# Patient Record
Sex: FEMALE | Race: WHITE | NOT HISPANIC OR LATINO | Employment: STUDENT | URBAN - METROPOLITAN AREA
[De-identification: names, ages, dates, MRNs, and addresses within clinical notes are randomized per-mention and may not be internally consistent; named-entity substitution may affect disease eponyms.]

---
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
## 2022-01-19 ENCOUNTER — PATIENT MESSAGE (OUTPATIENT)
Dept: ALLERGY | Facility: CLINIC | Age: 22
End: 2022-01-19
Payer: COMMERCIAL

## 2022-04-07 ENCOUNTER — LAB VISIT (OUTPATIENT)
Dept: LAB | Facility: HOSPITAL | Age: 22
End: 2022-04-07
Attending: FAMILY MEDICINE
Payer: COMMERCIAL

## 2022-04-07 ENCOUNTER — OFFICE VISIT (OUTPATIENT)
Dept: ALLERGY | Facility: CLINIC | Age: 22
End: 2022-04-07
Payer: COMMERCIAL

## 2022-04-07 VITALS
HEIGHT: 60 IN | TEMPERATURE: 98 F | DIASTOLIC BLOOD PRESSURE: 81 MMHG | SYSTOLIC BLOOD PRESSURE: 129 MMHG | RESPIRATION RATE: 20 BRPM | HEART RATE: 73 BPM | WEIGHT: 121.5 LBS | BODY MASS INDEX: 23.85 KG/M2

## 2022-04-07 DIAGNOSIS — G93.49 HASHIMOTO ENCEPHALOPATHY: ICD-10-CM

## 2022-04-07 DIAGNOSIS — Z79.60 LONG-TERM USE OF IMMUNOSUPPRESSANT MEDICATION: ICD-10-CM

## 2022-04-07 DIAGNOSIS — E55.9 VITAMIN D INSUFFICIENCY: ICD-10-CM

## 2022-04-07 DIAGNOSIS — E06.3 HASHIMOTO ENCEPHALOPATHY: Primary | ICD-10-CM

## 2022-04-07 DIAGNOSIS — E06.3 HASHIMOTO ENCEPHALOPATHY: ICD-10-CM

## 2022-04-07 DIAGNOSIS — G93.49 HASHIMOTO ENCEPHALOPATHY: Primary | ICD-10-CM

## 2022-04-07 LAB
25(OH)D3+25(OH)D2 SERPL-MCNC: 50 NG/ML (ref 30–96)
ALBUMIN SERPL BCP-MCNC: 4.3 G/DL (ref 3.5–5.2)
ALP SERPL-CCNC: 62 U/L (ref 55–135)
ALT SERPL W/O P-5'-P-CCNC: 12 U/L (ref 10–44)
ANION GAP SERPL CALC-SCNC: 10 MMOL/L (ref 8–16)
AST SERPL-CCNC: 17 U/L (ref 10–40)
BASOPHILS # BLD AUTO: 0.04 K/UL (ref 0–0.2)
BASOPHILS NFR BLD: 0.5 % (ref 0–1.9)
BILIRUB SERPL-MCNC: 0.3 MG/DL (ref 0.1–1)
BUN SERPL-MCNC: 13 MG/DL (ref 6–20)
CALCIUM SERPL-MCNC: 10.3 MG/DL (ref 8.7–10.5)
CHLORIDE SERPL-SCNC: 99 MMOL/L (ref 95–110)
CO2 SERPL-SCNC: 28 MMOL/L (ref 23–29)
CREAT SERPL-MCNC: 0.6 MG/DL (ref 0.5–1.4)
CRP SERPL-MCNC: 1 MG/L (ref 0–8.2)
DIFFERENTIAL METHOD: ABNORMAL
EOSINOPHIL # BLD AUTO: 0.2 K/UL (ref 0–0.5)
EOSINOPHIL NFR BLD: 2.9 % (ref 0–8)
ERYTHROCYTE [DISTWIDTH] IN BLOOD BY AUTOMATED COUNT: 12.5 % (ref 11.5–14.5)
ERYTHROCYTE [SEDIMENTATION RATE] IN BLOOD BY WESTERGREN METHOD: 20 MM/HR (ref 0–36)
EST. GFR  (AFRICAN AMERICAN): >60 ML/MIN/1.73 M^2
EST. GFR  (NON AFRICAN AMERICAN): >60 ML/MIN/1.73 M^2
GLUCOSE SERPL-MCNC: 80 MG/DL (ref 70–110)
HCT VFR BLD AUTO: 40.5 % (ref 37–48.5)
HGB BLD-MCNC: 13 G/DL (ref 12–16)
IMM GRANULOCYTES # BLD AUTO: 0.02 K/UL (ref 0–0.04)
IMM GRANULOCYTES NFR BLD AUTO: 0.3 % (ref 0–0.5)
LYMPHOCYTES # BLD AUTO: 2.2 K/UL (ref 1–4.8)
LYMPHOCYTES NFR BLD: 30.4 % (ref 18–48)
MCH RBC QN AUTO: 29.4 PG (ref 27–31)
MCHC RBC AUTO-ENTMCNC: 32.1 G/DL (ref 32–36)
MCV RBC AUTO: 92 FL (ref 82–98)
MONOCYTES # BLD AUTO: 0.7 K/UL (ref 0.3–1)
MONOCYTES NFR BLD: 9.3 % (ref 4–15)
NEUTROPHILS # BLD AUTO: 4.1 K/UL (ref 1.8–7.7)
NEUTROPHILS NFR BLD: 56.6 % (ref 38–73)
NRBC BLD-RTO: 0 /100 WBC
PLATELET # BLD AUTO: 544 K/UL (ref 150–450)
PMV BLD AUTO: 9.4 FL (ref 9.2–12.9)
POTASSIUM SERPL-SCNC: 4 MMOL/L (ref 3.5–5.1)
PROT SERPL-MCNC: 7.7 G/DL (ref 6–8.4)
RBC # BLD AUTO: 4.42 M/UL (ref 4–5.4)
SODIUM SERPL-SCNC: 137 MMOL/L (ref 136–145)
T4 FREE SERPL-MCNC: 0.86 NG/DL (ref 0.71–1.51)
THYROGLOB AB SERPL IA-ACNC: <4 IU/ML (ref 0–3.9)
THYROPEROXIDASE IGG SERPL-ACNC: 160.8 IU/ML
TSH SERPL DL<=0.005 MIU/L-ACNC: 1.03 UIU/ML (ref 0.4–4)
WBC # BLD AUTO: 7.3 K/UL (ref 3.9–12.7)

## 2022-04-07 PROCEDURE — 1160F PR REVIEW ALL MEDS BY PRESCRIBER/CLIN PHARMACIST DOCUMENTED: ICD-10-PCS | Mod: CPTII,S$GLB,, | Performed by: PEDIATRICS

## 2022-04-07 PROCEDURE — 1160F RVW MEDS BY RX/DR IN RCRD: CPT | Mod: CPTII,S$GLB,, | Performed by: PEDIATRICS

## 2022-04-07 PROCEDURE — 86376 MICROSOMAL ANTIBODY EACH: CPT | Performed by: PEDIATRICS

## 2022-04-07 PROCEDURE — 3074F PR MOST RECENT SYSTOLIC BLOOD PRESSURE < 130 MM HG: ICD-10-PCS | Mod: CPTII,S$GLB,, | Performed by: PEDIATRICS

## 2022-04-07 PROCEDURE — 1159F MED LIST DOCD IN RCRD: CPT | Mod: CPTII,S$GLB,, | Performed by: PEDIATRICS

## 2022-04-07 PROCEDURE — 85652 RBC SED RATE AUTOMATED: CPT | Performed by: PEDIATRICS

## 2022-04-07 PROCEDURE — 3008F PR BODY MASS INDEX (BMI) DOCUMENTED: ICD-10-PCS | Mod: CPTII,S$GLB,, | Performed by: PEDIATRICS

## 2022-04-07 PROCEDURE — 3074F SYST BP LT 130 MM HG: CPT | Mod: CPTII,S$GLB,, | Performed by: PEDIATRICS

## 2022-04-07 PROCEDURE — 84439 ASSAY OF FREE THYROXINE: CPT | Performed by: PEDIATRICS

## 2022-04-07 PROCEDURE — 36415 COLL VENOUS BLD VENIPUNCTURE: CPT | Performed by: PEDIATRICS

## 2022-04-07 PROCEDURE — 3079F DIAST BP 80-89 MM HG: CPT | Mod: CPTII,S$GLB,, | Performed by: PEDIATRICS

## 2022-04-07 PROCEDURE — 86140 C-REACTIVE PROTEIN: CPT | Performed by: PEDIATRICS

## 2022-04-07 PROCEDURE — 86800 THYROGLOBULIN ANTIBODY: CPT | Performed by: PEDIATRICS

## 2022-04-07 PROCEDURE — 82306 VITAMIN D 25 HYDROXY: CPT | Performed by: PEDIATRICS

## 2022-04-07 PROCEDURE — 99999 PR PBB SHADOW E&M-EST. PATIENT-LVL IV: ICD-10-PCS | Mod: PBBFAC,,, | Performed by: PEDIATRICS

## 2022-04-07 PROCEDURE — 80053 COMPREHEN METABOLIC PANEL: CPT | Performed by: PEDIATRICS

## 2022-04-07 PROCEDURE — 84443 ASSAY THYROID STIM HORMONE: CPT | Performed by: PEDIATRICS

## 2022-04-07 PROCEDURE — 85025 COMPLETE CBC W/AUTO DIFF WBC: CPT | Performed by: PEDIATRICS

## 2022-04-07 PROCEDURE — 3079F PR MOST RECENT DIASTOLIC BLOOD PRESSURE 80-89 MM HG: ICD-10-PCS | Mod: CPTII,S$GLB,, | Performed by: PEDIATRICS

## 2022-04-07 PROCEDURE — 99215 PR OFFICE/OUTPT VISIT, EST, LEVL V, 40-54 MIN: ICD-10-PCS | Mod: S$GLB,,, | Performed by: PEDIATRICS

## 2022-04-07 PROCEDURE — 1159F PR MEDICATION LIST DOCUMENTED IN MEDICAL RECORD: ICD-10-PCS | Mod: CPTII,S$GLB,, | Performed by: PEDIATRICS

## 2022-04-07 PROCEDURE — 99215 OFFICE O/P EST HI 40 MIN: CPT | Mod: S$GLB,,, | Performed by: PEDIATRICS

## 2022-04-07 PROCEDURE — 3008F BODY MASS INDEX DOCD: CPT | Mod: CPTII,S$GLB,, | Performed by: PEDIATRICS

## 2022-04-07 PROCEDURE — 99999 PR PBB SHADOW E&M-EST. PATIENT-LVL IV: CPT | Mod: PBBFAC,,, | Performed by: PEDIATRICS

## 2022-04-07 NOTE — PATIENT INSTRUCTIONS
We need to start looking for someone to follow you ... I don't think there is anyone in [place], likely you will have to go to [place].    I will see if this person will see you or if he has a suggestion of who in adult Neuroimmunology is available. I know there is someone in [place] but there must be someone in [place].  Zachary Arambula MD  Pediatric Neurology Immunology  [facility]  [address] [place] [address]. [address]  [place] [address]  [phone]

## 2022-04-07 NOTE — PROGRESS NOTES
[facility] - RETURN VISIT     Name:Sharda Turcios   : 2000   MR#: [id number]    Date of Visit: 2022  Date of last visit: 2021    HPI   The patient is a 21 y.o. 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   The patient is accompanied by the mother.   The history was obtained from the patient with some input from the mother as well as chart review.  Her initial clinic visit as a hospital follow up was 10/3/16.   The PCP is Dr.Jose Bonilla.      CC:   Chief Complaint   Patient presents with    Follow-up     Had a hand tremor twice that they are concerned about. Doing well in school and living with a friend now       RHEUM INTERIM HX  SEP 2021-2022 (UPDATE ALL)  General: Has been well since last visit. Has been studying a lot.   Meds: Cellcept 1000 mg in the AM and 500 mg at night, VitaminD 2000U daily, and multivitamin  Neuro: Has had two episodes of hand tremor since last visit which lasted about one minute   Joints: no pain, swelling, stiffness  Skin: no rashes except KP  Ophtho: Last visit with ophthalmologist was 2019, exams yearly are recommended. Saw optometrist 2020.  No problems with vision, no eye redness or pain.   GI: No issues  Infections/Antibiotics: had COVID-19 in January but did well; no antibiotic use     Flu/COVID Vaccine: Has received covid vaccine (Moderna, 3/16/21 and 2021, 2021); also received flu vaccine 10/2021  New Issues: As above in General  Social: Second year at [employer], studying nursing. Living with roomates    DENIES:  Alopecia  Chest pain  Fatigue  Fevers  Headaches  Malar rash  Muscle weakness   Myalgias  Oral sores  Photosensitivity  Rashes   Weakness  +Raynaud's phenomena, with cold worse in winter      MEDS CUR:     Current Outpatient Medications:     ammonium lactate 12 % Crea, Apply to skin  daily after shower (Patient taking differently: daily as needed. Apply to skin daily after shower), Disp: 1 Bottle, Rfl: 3    cholecalciferol, vitamin D3, (VITAMIN D3) 50 mcg (2,000 unit) Tab, Take 2,000 Units by mouth once daily. , Disp: , Rfl:     multivitamin capsule, Take 1 capsule by mouth., Disp: , Rfl:     mycophenolate (CELLCEPT) 500 mg Tab, 2 tabs (1000 mg) by moth twice a day (Patient taking differently: 2 tabs (1000 mg) AM, 1 tab PM), Disp: 320 tablet, Rfl: 2    fexofenadine (ALLEGRA) 180 MG tablet, Take 180 mg by mouth., Disp: , Rfl:       ROS:   A ROS was conducted and is as noted above. It is negative for symptoms related to the general, dermatologic, HEENT, respiratory, cardiovascular, gastrointestinal, genitourinary, musculoskeletal, neurologic, endocrine, hematologic, psychiatric and immunologic systems other than those mentioned in the HPI.     PMHx:   Narrative Summary::   -- Sharda at presentation was a 16 yr old girl with a previous unremarkable PMHx other than a hx of migraines associated with menses who was transferred to [facility] on 9/6/16 in questionable status epilepticus vs prolonged myoclonus and florid encephalopathy following a first seizure and ~ 6 weeks of recurrent falls/drop attacks without LOC as well as visual disturbances, memory loss, and tremors. Subsequently the family discovered that her grades had markedly worsened in April/May but she did ot tell them at the time.  Family reported that the first episode was 7/21/16 where she had an episode of collapse without LOC but followed by confusion. Had a head CT which was normal. Had multiple other episodes leading to evaluation by both Neurology and Cardiology locally but falls started to occur so often that she was sent to school in a wheelchair. The final episode was falling out of her wheelchair with LOC and probable seizure with loss of bladder control; brought to local hospital by EMS and ultimately was intubated  "and transferred to [facility] for further care. She arrived here in sustained myoclonus and severely encephalopathic, with no awareness of her surroundings and random abnormal movements; she did withdraw to pain.   Her diagnosis, made on 9/9/16, was Hashimotos Encephalopathy/SREAT based on positive anti-thyroid peroxidase antibodies in serum (184, neg < 20), diffuse slowing on EEG, CSF with mod elevated protein and mild lymphocytosis, nl MRI/MRV/MRA and anti- NMDA R Abs negative in CSF and serum with infectious work up negative.   She was treated with 5 days of steroid pulse 1 gm/day (9/9 - 9/13) followed by 5 days of plasma exchange therapy (9/14 - 9/18) and one infusion of Rituximab 750 mg/m2 given on Monday 9/19/16. She was onserved for 24 hrs after the infusion in the PICU and then transferred to the floor.   Her mental status improved significantly during her hospitalization so that she was ambulatory, alert and oriented, and recalled that she had "lost 2 weeks" and had memories of what happened "like a dream or a nightmare." Still with some fine motor deficits and speech not completely back to baseline.   SUMMARY OF HOSPITAL COURSE:   CNS:   - keppra 500 BID per neuro as pt at risk for seizures.   - LP - sl elevated protein and mild lymphocytosis; all other studies and cultures to date (-).   - pediatric neurology consulted; managed with Dr. Turcios.   - 24hr EEG with diffuse slowing supporting diagnosis of encephalopathy.   - MRI results normal other than a developmental venous anomaly felt to be insignificant; otherwise nl MRV and MRA as well.   - NMDA-R Ab, Anti-JUSTIN, acylcarnitine negative.   - BRINA positive 1:160, all in profile (-); mildly elevated ESR/CRP at onset.   - Anti-thyroid antibodies postive, jama TPO; euthryoid.   - s/p 5 days of treatment with IV solumedtrol 1 gm/day followed by 5 days of plasma exchange and then Rituximab 750 mg/m2 on 9/19 (needs to be given dose #2 on 10/3). Will continue with " prednisone 30mg bid until second dose of Ritux, then will wean as rapidly as possible.   - Labs pending: microarray, organic acids, amino acids, MPS, carnitine, and epilepsy panel.   - Child psychiatry consulted and following. Will use seroquel 25mg q6 prn per their recs for agitation and sleep   - PT/OT/speech - continue as outpatient.   - Homebound school anticipated for 4-6 weeks   CV: Was hypertensive, likely 2/2 steroids + agitation   - HTN: Norvasc 5 mg PO BID.   PULM: GIO   -s/p short course of zosyn for aspiration PNA during status epilepticus.   ID: + Urine Culture for Enterobacter, discharged on Bactrim.   FEN/GI:   -Zantac 150 mg BID, Pantoprazole 40 mg Qday PO for gastritis secondary to steroids.   -May have zofran 4mg ODT for nausea.   /RENAL:   -CPK mildly elevated on arrival, secondary to active seizure at the time.   -UDS here positive for benzos. Urine Mass Spec positive for versed, keppra, phenylpropanolamine, and ephedrine; only ephedrine had not been given, ? cross reactivity with other meds - Intoxication/drug reaction originally thought to be possible etiology of seizure, disproven.   SKIN/MSK: significant diaper rash - continue topical care. Has 2 hyperpigmented areas on back and leg which are cafe au lait spots.   Diet: Pediatric Diet   SOCIAL: Mom updated on plan at bedside, all questions answered.   Dispo: Discharged home on 9/21/16.   Follow up with Peds A/I/R 9:20 am 10/3/16 for second dose of Rituximab; Will need outpatient evaluation for PT/OT/Speech.   Homebound school for now, likely 6-8 weeks minimum; paperwork completed.   ~~~ Oct 3, 2016: Since discharge has done well. Has not needed PT, no falling or unsteadiness. Has not needed OT either; is drawing fine and playing guitar again. Speech will start this Thursday at [facility].   Homebound was supposed to start today but because they are at this appointment, have been told that the teacher will not come out until next  Monday.   Is nearly back to normal; is still tired and not sleeping well.   Mild headache one day but nothing else. No seizures or abnormal movements. Keppra is being weaned by Dr. Turcios.   Had some itching the first two nights home, not severe and has not recurred.   No dizziness; still on high dose antihypertensive medication.   No more abdominal pain or nausea; Mom never did increase the Zantac from 75 mg to 150 mg po BID.   No fevers, cough, URI symptoms.   Everyone in the family needs a flu shot.   At the visit she was given a second dose of Rituximab as well as a weaning schedule for Prednisone to go from 20 mg po BID to once a day over the next month, and started on Methotrexate 15 mg po once a week; also started on Ergocalciferol 50,000 IU once a week for a very low Vit D level. Her thyroid Abs had decreased as expected and other labs were normal.   ~~~ OCT 3 - NOV 2, 2016: Doing very well overall. No hospitalizations or ED visits since last clinic visit. Mom was concerned about staring spells and spoke with Dr. Turcios regarding this issue . Sara is aware when the spells, which last a few seconds, occur, so not likely to be seizures but pt will be scheduled for an EEG at [facility] in [place]. Since last visit pt started on steroid taper; mom states she did 20 mg BID for 7 days, then 40 mg daily, then 30 mg daily for 7-10 days starting on 10/18, now currently on 20 mg daily which she has been on for about a week . Pt continues to take Pantoprazole once a day, Ranitidine BID, Vitamin D 50,000 weekly and Methotrexate 15 mg weekly po. Pt tolerating medications well without side effects. Pt has d/liudmila Keppra about 5 weeks ago and Amlodipine about 2 weeks ago after a wean. Pt states she is doing well. Mom agrees with this statement and has no concerns. Pt w/ one episode of muscle spasms that has since resolved. Has some excessive salivation with speaking, but this has improved. Has gained weight in her face and  waistline as expected. She has not yet had her menses resume.   Mild muscle pain in her calf, after walking for prolonged period; no joint swelling or pain and no weakness now. No falls or difficulty ambulating. Headaches now less frequent and less severe, once or twice a week and brief. Fatigue much improved; now able to socialize with friends. Having some mild heartburn and burping while on PPI and H2 blocker, improving; has decreased broccoli and cauliflower.   Is being discharged from Speech Therapy this week; will be tested first.   She did get a flu vaccine along with all family members other than her older sister, who will be sent to live with  if she has any symptoms of illness.   Mother requests that Homebound be continued until the Christmas holidays so that she can rejoin her class in January.  Steroid wean was continued.  ~~~ NOV 2 - DEC 15, 2016: Had normal EEG at [facility] 12/7/16.   Had abd pain and diarrhea 3 weeks ago; PCP put on Flagyl which helped. Off x 2 weeks now without resumption of symptoms.   Made an Excellent on her end of course test !   Patient is feeling well, denies headache, blurr vision, fever, shortness of breath. No special complaint. She is going back to school in January, will not need homebound anymore.   She is on tapering dose of prednisone, now taking 10mg once daily. She is also on methotrexate 15 mg po (6 2.5mg tabs) every Wednesday.   Was asked to alt Prednisone 10 mg with 7.5 mg and increase MTX to 8 tabs (20 mg).   ~~~ DEC 15, 2016 - JAN 26, 2017: Started back at school. Enjoys geography and english classes. So far her grades are excellent. Memory is ok. Will repeat questions, but when prompted, she remembers the answers provided previously. Reports fatigue after school. Reports difficulty sleeping. Reports recent dark stool x 1 (not black, but darker than normal). Reports nocturia. Taking Prednisone 20 mg daily. Mycophenylate Mofetil was started a few weeks ago as she was  "not tolerating her steroid wean; now on 500 mg pills 2 pills twice daily (increased four days ago from 1 pill twice daily). Taking Methotrexate 20 mg (2.5 mg pills 8 pills) weekly.   ~~~ JAN 26 - MARCH 9, 2017: Made all As on report cards. No stomach pains. Still having headaches, mostly in afternoon. Often associated with being outside or activity in the setting of being dehydrated. Left side of forehead, she is not sure if pounding or squeezing in nature. No nausea. No change in vision or visual aura. Sensitive to light when headache is present. Does not report sensitivity to sound. Has never woken up with a headache. Has taken tylenol or no medication at all for her headaches. No caffeine.   Jennifer has described "not feeling right" to mom once. Jennifer reports that she felt dizzy. Jennifer had the week off for [employer] and feels that she forgot a lot more academic material than she usually does over a one week break. Occasionally she reports fatigue, but not to the extent that she needs to stop everything and lie down. Is running and exercising. Sleeping well.   Current meds: Methotrexate 2.5 mg 5 tabs weekly, Prednisone 10 mg daily, Mycophenolate 2 500 mg tabs BID, Protonix daily.   ~~~ MARCH 9 - APRIL 27, 2017: No problems. No infections or illnesses since last visit. Feeling well. No fatigue. No staring spells. no suggestions of any siezures. Headaches have improved after increasing her daily water intake. Doing well in school. Creating art. Excited about taking 's ed this summer.   Saw ophthalmologist in [place] and mom says eye exam was perfect.   No rashes, fevers, GI symptoms, or other issues.   Remains on Prednisone 10 mg daily as well as Mycophenolate 1 gm BID and MTX 20 mg po weekly. Will try to wean MTX and steroids.   ~~~ APRIL 27 - JUNE 22, 2017: Has done well from the standpooint of the HE. No headaches for the past month, down to 7.5 mg of Prednisone also for the past month. MTX dose is " "12.5 mg once a week now. Sometimes is a little forgetful. Was slightly clumsy for a bit as well but this has improved. No abnormal movements. She has sometimes run to her room rather than walking (which is worrisome to Mom but Sara and Dad think she is just happy and bored being inseide with the rainy weather).   Went to the beach for a week with a URI and a few days later started to cough, Temp 99.6. Went to Urgent Care and did an exam, said chest was clear, dx as "allergies" and started Claritin and Flonse. Also gave a prescription for Augmentin but told the family to give it "if they think they need to or if she gets worse" so it was never started. These seemed to help some so stopped Clairin 4 days ago; still on Flonase and Mucinex. Cough has not completely resolved and nose is still congested.   Coughs the most when first lying down and this remains although the cough is much less. Was seen by new PCP yesterday who thought he heard some wheezing but did not do anything since coming here today.   No fever since the first day.   Has had some urine leakage occasionally and also has had some vaginal pain intermittently that is severe enough to make her stand still for a minute or so. She has never been seen by a GYN. Labs here showed some bacterial vaginosis. Continued the MTX wean.  ~~~ JUNE 22 - AUG 31, 2017: Doing well. Several minor issues but no major concerns today.   Meds: Prednisone 7.5 mg daily; CellCept 1 gm BID; MTX 10 mg po q week; Pantoprazole 40 mg q day and Ranitidine 75 qhs.   Headaches/Neuro symptoms:Had a posterior HA this morning for about an hour total, gone now. Has only had one migraine since last year, has had less than one HA a week or every other week. In terms of memory, was/is a little anxious with 's ed; was nervous and a little distracted/forgetful prior to the start of school. This resolved.   Has had a couple of times feeling that hands were numb after running 2 miles " "sometimes, and once after riding a bike. No numbness other times.   Joints: No issues. Skin:No rashes.   Ophtho: Has an appt with Ophtho next week. She is doing 's Ed but was having a hard time reading street signs at dusk. Denies difficulty with seeing the board.   GI: Having reflux issues, taking Ranitidine once a day at night. Sometimes has to spit what she is refluxing, throat burns.   Respiratory: Occasionally has sniffles, occasional cough only at night. Had a Region 6 profile sent at the last visit, positive only for [place] ( polinates in January).   Infections/Antibiotics: None. PCP did a urine culture and nothing was there, so was not put on any antibiotics for the bacterial vaginosis.   New Issues: Still has not had a menstrual period in about 5 months, was irregular even before.   Social: 10th grade, same school as before. Is taking PE this year. No issues to date. Would like a note so that she may use the restroom as needed.   ~~~ AUG 31 - DEC 7, 2017: Doing well.   Meds: Prednisone alternating 7.5 and 5mg every other day. Taking Protonix 40 mg daily, methotrexate 7.5 mg once a week, cellcept 1000 BID.   Neuro: Had 2 HAs in September, 1 in October, 4 in November, and had one earlier on this month. She will take tylenol as needed. Has discussed this with Dr. Turcios at a previous visit and was told not to take too much tylenol and at some point would consider Topamax. Has had 2 episodes of head feeling "funny". She is becoming less forgetful since last visit. She has been doing well in school and is getting all A's.   GI: felt nauseated after a run x 1.   No issues with joints, skin rashes, vision, infections. Had flu vaccine.   ~~~ DEC 2017 - MARCH 2018: At Dec visit asked to wean MTX to 3 tabs and wean prednisone to 5 mg daily for 4 weeks, then 5 QOD for 4 weeks then stop. Discussed calling for stress dose steroids with fever, illness,   etc. Doing well after following instructions. No " "issues or concerns - feels well!   Meds: Mycophenolate 1gm BID, MTX 3 tabs (7.5 mg) once a week; off steroids and no gastric protection.   Neuro: Headaches x 6-7/3 months. Had more last spring. Has been sneezing more in Jan and Feb during [place] season.   Occasional "quirky" behaviors, nothing significant.   No MS pains, occasional back pain with heavy backpack. Does not use her locker. Does not have a locker this semester but now has a lot of books. No rashes, no GI issues. No infections/abx this winter, no flu.   To see Ophtho in April, no symptoms.   Social: School going OK. Good grades.   ~~~ MARCH - MAY 2018: Last visit had moderately elevated transaminases, EBV (-) so MTX was weaned rapidly then stopped by Mom.   Meds: Mycophenolate 1gm BID; off MTX ~ 4 weeks, off steroids x 3 months and no longer on gastric protection.   Neuro: Headaches much less frequent, 3 total over 2 months other than some with her URI earlier this week.   Behavior: One or two brief episodes of minor concern to Mom but nothing substantial. Occasional "quirky" behaviors, nothing significant.   Had a mild viral illness earlier this week, episode where she appeared "glassy eyed" and asked mother for cheese when she woke up; mother thought pt was sleep walking.   Allergic rhinitis: Positive to [place], had symptoms Jan/Feb .   Joints: No pain, swelling, or other issues.   Skin: Small area of atopic derm in R antecubital fossae; burned when used Lachydrin.   Ophtho: Rx increased a bit and will see in a year   GI: Some reflux, controlled with Tums.   Infections/Antibiotics: None   Menses: Finally getting back to normal, had twice since last visit, March, April, hasn't happened this Month, thinks its getting back to normal   New Issues: Wants to do [place] which would start in July - told not to run in the heat.   ~~~ MAY - SEPT 2018: Things overall have been good.   School is good, in grade 11. All A's. Wants to be come " nurse.   Since the last visit had some symptoms of concern (see telephone encounter from 8/22) and thus was restarted on prednisone 10mg and labs were done ; labs done 8/23/18 were unremarkable: CRP < 0.50 mg/dL, ESR 7; WBC 6.34 -> 47N 38L 10.6M 3.55E, H/H 11.7/36.7, plts 457. IgG 921, IgA 163, IgM 39. CMP cr 0.58, T Prot 7.1, alb 4.5, AST 20, ALT 17TPO Ab 11.5 (< 10, unchanged).   Meds: Prediosne 10mg daily, Vitamin D 2000 daily, Cellcept 1g BID   Neuro/Behavior: Roughly 2 weeks ago mom started to notice that Jay Jay's hands sometimes have a motion like a little flutter. Initially it was the left but now it is both. Due to the concern Sharda was restarted on prednisone 10mg daily two weeks ago. Mom does not feel that there has been a decrease in the hand motions since starting the prednisone. Mom notices that when Sharda is using her hands for something that no fluttering occurs but if hands are doing nothing then fluttering is noticed (eg: driving in the care to school). Jay Jay thinks that she can stop the hand motions when mom tells her but not 100% sure. Jay Jay has otherwise been doing well with good concentration, not forgetful and grades have been good.   Allergic rhinitis: Not doing anything stopped that over a year ago. Positive to [place] which is frequent in Jan/Feb.   Joints: no issues  Skin: sometimes hands get dry. Ophtho: no issues at this time; does follow-up with optho. GI: no issues   Infections/Antibiotics: had wisdom teeth pulled one month ago and was put on Z-leela but mom stopped it after 3 days becasue she started to get upset stomach; no antibiotic prophylaxis was done prior to wisdom teeth removal despite being on cellcept. Menses: irregular   New Issues: hand fluttering as mentioned above   School/Social: grade 11, Grades all A. Plan was to wean steroid again.  ~~~ SEPT - DEC 2018: Doing very well - no concerns of family.   Meds: Vitamin D 2000 daily, Cellcept 1g BID. Off  steroids  Neuro/Behavior: No abnormalities reported. No new tremors or hand jittering. Not having increased forgetfulnessss. No headaches.   Allergic rhinitis: not having any problems right now. Positive to [place] which is frequent in Jan/Feb.   Joints: no musculoskeletal pain, no swelling.   Skin: having white discoloration of a few finger tips with numbness that has occurred for the first time this year during cold temperature; lasts a few minutes and self resolves.   Ophtho: last visit was in April or May of this year and everything was normal.   GI: lingering GERD (was initially worse while on prednisone); no longer on a PPI or H2 blocker. Not bothering her enough to want to restart anything.   Infections/Antibiotics: none.  Menses: continue to be irregular but she has her period now.   New Issues: finger discoloration; otherwise no new issues. School/Social: doing well in school, making all A's. No social concerns.   ~~~ DEC 2018 - April 2019: Still doing very well.   Meds: Mycophenolate 1000 IU (500 mg 2 pills) BID. Vitamin D 1000 IU OTC one pill daily.   Neuro/Behavior: Has had a total of three headaches in the past 4 months. No other neurological symptoms. Has been a little forgetful.   Skin: Keratosis Pilaris, otherwise no rashes.   Ophtho: Last visit was May 2018. Last three exams were all benign. Yearly check up with optometry scheduled for a few weeks.   GI: Reflux symptoms for which she takes Tums occasionally.  Joints: No joint pain or swelling.   Infections/Antibiotics: None since last visit. Flu Vaccine: Received this year.   New Issues: Needs the menning[place]cal vaccine and wants to make sure its ok.   Social/Grade/PE/Sports: 11th grade. Jogs for exercise, prom committee. Doing very well in school. Voted [employer].   No issues, nl PE and labs. Mycophenolate decreased to 1000 mg q am, 500 mg po at bedtime.   ~~~AUG - DEC 2019   Doing very well. Not seeing Neuro today - not having headaches  "or other issues.  Meds: Mycophenolate 500mg 2 tabs morning, 1 tab night. daily vitamin D supplementation (over the counter)  Joints: No issues, no swelling/stiffness/pain. Skin: No issues with any rashes.  Ophtho: Last visit was in April with optometrist, stopped seeing ophthalmology. No major problems with vision, no eye redness or pain.  GI: Seldom has reflux symptoms; does not take anything for it.  Infections/Antibiotics: no antibiotics but has a URI and feels like her L ear is "clogged". Has mild rhinitis currently, had fever for 1 day (100.5) but this resolved. Was negative for strep and flu.  New Issues: No difference since going down on mycophenolate from 2000 daily to 1500 daily.   Social/Grade/PE/Sports: [employer], senior. Interested in playing tennis, but she is in plenty of school clubs and committees. Still wants to go to nursing school.   No issues, nl PE other than URI/serous otitis. Labs with sl increased CRP secondary to the OM. Mycophenolate decreased to 500 mg po BID.   ~~~ DEC 2019 - MAY 2020   Has been doing well since last visit. On Cellcept 500mg BID and tolerating well. Overall no big changes since last visit and patient and mother are happy with current plan at this time.   Meds: Cellcept 500mg BID, MVN and Vit D   euro: No new issues, two mild headaches which resolved with tylenol, no other new deficits or other concerns, no seizures or mental confusion.  Joints: No new issues, no pain/redness/swelling. Skin: No new issues  Ophtho: Last visit was April 2019, exams yearly are recommended. No problems with vision, no eye redness or pain  GI: Denies any GI issues. Infections/Antibiotics: Denies any new infections or courses of antibiotics  Social: Graduated HS, will start at [employer] and study nursing starting in August 2020. Living situation for college not yet determined. At home this summer, social distancing   Secondary to the pandemic.  ~~~ MAY 2020 - MAY 2021   General: doing " well, no complaints. Meds: Cellcept 1000 mg in the am, MVN and Vit D. Continue  ~~~ DEC 2020 - MAY 2021  ~~~ MAY - SEP 2021  General: Had a few unusual episodes over last month, was previously clinically stable and slowly titrating down cellcept. Episodes as follow: left eye twitching x 1 month was every day a few times a day and lasts a few minutes (has video), had HA last Thursday that felt like bad migraine from before her diagnosis relief a few hours later with Tylenol 2 tabs and cold rag over her eyes. On 9/13/21 had a brain fog for a few hours and felt a little weak, then resolved.  Meds: Cellcept 1000 BID and VitaminD 2000U daily  No changes made to meds.    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) diagnosed by LP/labs Sept 2016; tx plasmapheresis, pulse IV solumedrol, Rituximab x 2 Oct 2016   MRI brain w wo with MRA 9/7/2016@[facility] - normal except right temporal DVA   MRI total spine 9/7/2016@[facility] - normal   Immunizations: up to date by report; NO LIVE VIRAL VACCINES WHILE ON IMMUNOSUPPRESSIVE MEDICATIONS   TB testing: T spot negative 11/2/16   [facility] 12/7/2016[facility] - normal   Ophtho 3/23/2017 - normal exam   Allergy Panel [place] [facility] 6/22/17: low positive [place] only (pollinates in Jan)   Off steroids and methotrexate, on Mycophenolate alone April 2018; briefly back on low dose steroids Sept- Oct 2018 about 6 weeks                Past Surgical History:   Procedure Laterality Date    ADENOIDECTOMY        TONSILLECTOMY        TYMPANOSTOMY TUBE PLACEMENT          Allergies: Review of patient's allergies indicates:  No Known Allergies    FAMILY HX:   Father: alive, healthy   Mother: alive, Crohn's Dz   Sister(s): alive, healthy   Maternal Grand Mother: alive, SLE and Hashimoto's Thyroiditis   1  "sister(s) - healthy.   There is no other known family history of JRA/LISSETT, RA, Psoriasis, SLE, Sjogren's, Dermatomyositis, Scleroderma, Thyroiditis (Hashimotos or Graves), Raynaud's, Crohns/UC/inflammatory bowel disease, vitiligo, autoimmune cytopenias, immune deficiency, or unusual infections     SOCIAL HX:   See HPI. In [facility]    Pediatric-Oriented Exam:  Vitals:   Vitals:    04/07/22 1117   BP: 129/81   Pulse: 73   Resp: 20   Temp: 97.7 °F (36.5 °C)     Wt Readings from Last 1 Encounters:   04/07/22 55.1 kg (121 lb 7.6 oz)     VITAL SIGNS: reviewed.   NUTRITIONAL STATUS: Growth charts reviewed - Weight 55.1 kg, Height 4'11.57"   GENERAL APPEARANCE: well nourished, alert, active, NAD.   SKIN: no skin lesions, moist, warm.   HEAD: normocephalic, no alopecia.   EYES: EOMI, PERRL, conjunctivae clear, no infraorbital shiners.   EARS: TM's with mild otosclerosis bilaterally, no fluid visible.   NOSE: no nasal flaring, mucosa pink with normal turbinates, no drainage  ORAL CAVITY: moist mucus membranes, teeth in good repair, no lesions or ulcers, no cobblestoning of posterior pharynx, tonsils 0+.   LYMPH: no significant lymphadenopathy, no thyromegaly.   NECK: supple  CHEST: normal contour, no tenderness.   LUNGS: auscultation clear bilaterally, breath sounds normal.   HEART: RSR, no murmur, no rub.   ABDOMEN: soft, nontender  MS/BACK joints within normal limits throughout .   DIGITS: no cyanosis, edema, clubbing.   NEURO: no facial asymmetry  PSYCH: normal mood and affect for age.   EXTREMITIES: tone and power are equal and symmetrical.     RECORD REVIEW   08/01/2019   Thyroid Peroxidase Antibody 08/01/2019 9.0 (<9.0 IU/mL)   CBC, CMP, CRP and ESR all WNL    12/05/2019 (had otitis on exam)   C Reactive Protein  3.0  mg/dL    Thyroid Peroxidase Antibody  27.9  CBC with plts  641     Dec 2020: TPO Ab 330.  TSH nl, CBC nl, CMP nl, Thyroglobulin antibodies negative; drawn at [facility] per her insurance     May 2021:   " TSH nl, CBC nl, CMP nl, Thyroglobulin antibodies negative  Lab Visit on 05/10/2021   Component Date Value Ref Range Status    WBC 05/10/2021 5.64  3.9 - 12.7 K/uL Final    RBC 05/10/2021 4.45  4.00 - 5.40 M/uL Final    Hemoglobin 05/10/2021 13.0  12.0 - 16.0 g/dL Final    Hematocrit 05/10/2021 39.2  37.0 - 48.5 % Final    MCV 05/10/2021 88  82.0 - 98.0 fL Final    MCH 05/10/2021 29.2  27.0 - 31.0 pg Final    MCHC 05/10/2021 33.2  32.0 - 36.0 g/dL Final    RDW 05/10/2021 12.6  11.5 - 14.5 % Final    Platelets 05/10/2021 445  150 - 450 K/uL Final    MPV 05/10/2021 8.5* 9.2 - 12.9 fL Final    Immature Granulocytes 05/10/2021 0.2  0.0 - 0.5 % Final    Gran # (ANC) 05/10/2021 3.3  1.8 - 7.7 K/uL Final    Immature Grans (Abs) 05/10/2021 0.01  0.00 - 0.04 K/uL Final     Comment: Mild elevation in immature granulocytes is non specific and   can be seen in a variety of conditions including stress response,   acute inflammation, trauma and pregnancy. Correlation with other   laboratory and clinical findings is essential.       Lymph # 05/10/2021 1.7  1.0 - 4.8 K/uL Final    Mono # 05/10/2021 0.5  0.3 - 1.0 K/uL Final    Eos # 05/10/2021 0.1  0.0 - 0.5 K/uL Final    Baso # 05/10/2021 0.04  0.00 - 0.20 K/uL Final    nRBC 05/10/2021 0  0 /100 WBC Final    Gran % 05/10/2021 58.3  38.0 - 73.0 % Final    Lymph % 05/10/2021 29.4  18.0 - 48.0 % Final    Mono % 05/10/2021 8.9  4.0 - 15.0 % Final    Eosinophil % 05/10/2021 2.5  0.0 - 8.0 % Final    Basophil % 05/10/2021 0.7  0.0 - 1.9 % Final    Differential Method 05/10/2021 Automated    Final    Sodium 05/10/2021 140  136 - 145 mmol/L Final    Potassium 05/10/2021 3.9  3.5 - 5.1 mmol/L Final    Chloride 05/10/2021 102  95 - 110 mmol/L Final    CO2 05/10/2021 27  23 - 29 mmol/L Final    Glucose 05/10/2021 102  70 - 110 mg/dL Final    BUN 05/10/2021 11  6 - 20 mg/dL Final    Creatinine 05/10/2021 0.7  0.5 - 1.4 mg/dL Final    Calcium 05/10/2021 10.0  8.7  - 10.5 mg/dL Final    Total Protein 05/10/2021 8.0  6.0 - 8.4 g/dL Final    Albumin 05/10/2021 4.2  3.2 - 4.7 g/dL Final    Total Bilirubin 05/10/2021 0.2  0.1 - 1.0 mg/dL Final     Comment: For infants and newborns, interpretation of results should be based  on gestational age, weight and in agreement with clinical  observations.     Premature Infant recommended reference ranges:  Up to 24 hours.............<8.0 mg/dL  Up to 48 hours............<12.0 mg/dL  3-5 days..................<15.0 mg/dL  6-29 days.................<15.0 mg/dL       Alkaline Phosphatase 05/10/2021 72  48 - 95 U/L Final    AST 05/10/2021 19  10 - 40 U/L Final    ALT 05/10/2021 19  10 - 44 U/L Final    Anion Gap 05/10/2021 11  8 - 16 mmol/L Final    eGFR if African American 05/10/2021 >60.0  >60 mL/min/1.73 m^2 Final    eGFR if non African American 05/10/2021 >60.0  >60 mL/min/1.73 m^2 Final     Comment: Calculation used to obtain the estimated glomerular filtration  rate (eGFR) is the CKD-EPI equation.        CRP 05/10/2021 1.2  0.0 - 8.2 mg/L Final    Sed Rate 05/10/2021 11  0 - 36 mm/Hr Final    Vit D, 25-Hydroxy 05/10/2021 54  30 - 96 ng/mL Final     Comment: Vitamin D deficiency.........<10 ng/mL                              Vitamin D insufficiency......10-29 ng/mL       Vitamin D sufficiency........> or equal to 30 ng/mL  Vitamin D toxicity............>100 ng/mL       Thyroglobulin Ab Screen 05/10/2021 <4.0  0.0 - 3.9 IU/mL Final    Thyroperoxidase Antibodies 05/10/2021 202.8* <6.0 IU/mL Final    TSH 05/10/2021 1.450  0.40 - 4.00 uIU/mL Final      Lab Visit on 09/30/2021   Component Date Value Ref Range Status    WBC 09/30/2021 5.59  3.90 - 12.70 K/uL Final    RBC 09/30/2021 4.34  4.00 - 5.40 M/uL Final    Hemoglobin 09/30/2021 12.8  12.0 - 16.0 g/dL Final    Hematocrit 09/30/2021 38.3  37.0 - 48.5 % Final    MCV 09/30/2021 88  82 - 98 fL Final    MCH 09/30/2021 29.5  27.0 - 31.0 pg Final    MCHC 09/30/2021 33.4   32.0 - 36.0 g/dL Final    RDW 09/30/2021 12.4  11.5 - 14.5 % Final    Platelets 09/30/2021 489 (A) 150 - 450 K/uL Final    MPV 09/30/2021 8.3 (A) 9.2 - 12.9 fL Final    Immature Granulocytes 09/30/2021 0.7 (A) 0.0 - 0.5 % Final    Gran # (ANC) 09/30/2021 2.7  1.8 - 7.7 K/uL Final    Immature Grans (Abs) 09/30/2021 0.04  0.00 - 0.04 K/uL Final    Comment: Mild elevation in immature granulocytes is non specific and   can be seen in a variety of conditions including stress response,   acute inflammation, trauma and pregnancy. Correlation with other   laboratory and clinical findings is essential.      Lymph # 09/30/2021 2.1  1.0 - 4.8 K/uL Final    Mono # 09/30/2021 0.5  0.3 - 1.0 K/uL Final    Eos # 09/30/2021 0.2  0.0 - 0.5 K/uL Final    Baso # 09/30/2021 0.03  0.00 - 0.20 K/uL Final    nRBC 09/30/2021 0  0 /100 WBC Final    Gran % 09/30/2021 48.5  38.0 - 73.0 % Final    Lymph % 09/30/2021 38.3  18.0 - 48.0 % Final    Mono % 09/30/2021 8.6  4.0 - 15.0 % Final    Eosinophil % 09/30/2021 3.4  0.0 - 8.0 % Final    Basophil % 09/30/2021 0.5  0.0 - 1.9 % Final    Differential Method 09/30/2021 Automated   Final    Sodium 09/30/2021 134 (A) 136 - 145 mmol/L Final    Potassium 09/30/2021 3.8  3.5 - 5.1 mmol/L Final    Chloride 09/30/2021 97  95 - 110 mmol/L Final    CO2 09/30/2021 23  23 - 29 mmol/L Final    Glucose 09/30/2021 84  70 - 110 mg/dL Final    BUN 09/30/2021 9  6 - 20 mg/dL Final    Creatinine 09/30/2021 0.6  0.5 - 1.4 mg/dL Final    Calcium 09/30/2021 10.3  8.7 - 10.5 mg/dL Final    Total Protein 09/30/2021 8.0  6.0 - 8.4 g/dL Final    Albumin 09/30/2021 4.6  3.5 - 5.2 g/dL Final    Total Bilirubin 09/30/2021 0.2  0.1 - 1.0 mg/dL Final    Comment: For infants and newborns, interpretation of results should be based  on gestational age, weight and in agreement with clinical  observations.    Premature Infant recommended reference ranges:  Up to 24 hours.............<8.0 mg/dL  Up to 48  hours............<12.0 mg/dL  3-5 days..................<15.0 mg/dL  6-29 days.................<15.0 mg/dL      Alkaline Phosphatase 09/30/2021 64  55 - 135 U/L Final    AST 09/30/2021 20  10 - 40 U/L Final    ALT 09/30/2021 16  10 - 44 U/L Final    Anion Gap 09/30/2021 14  8 - 16 mmol/L Final    eGFR if African American 09/30/2021 >60.0  >60 mL/min/1.73 m^2 Final    eGFR if non African American 09/30/2021 >60.0  >60 mL/min/1.73 m^2 Final    Comment: Calculation used to obtain the estimated glomerular filtration  rate (eGFR) is the CKD-EPI equation.       CRP 09/30/2021 1.3  0.0 - 8.2 mg/L Final    Sed Rate 09/30/2021 27  0 - 36 mm/Hr Final    Thyroperoxidase Antibodies 09/30/2021 184.5 (A) <6.0 IU/mL Final    Thyroglobulin Ab Screen 09/30/2021 <4.0  0.0 - 3.9 IU/mL Final    TSH 09/30/2021 1.844  0.400 - 4.000 uIU/mL Final    Group & Rh 09/30/2021 O POS   Final    COVID-19 (SARS CoV-2) IgG Antibody* 09/30/2021 2104.2 (A) <50.0 AU/ml Final    COVID-19 (SARS CoV-2) IgG Antibody* 09/30/2021 Positive (A) Negative Final    Comment: This test is only for use under Food and Drug Administration's   Emergency Use Authorization (EUA). Commercial reagents are   provided by "[employer]". Performance characteristics have   been independently verified by [facility] Department   of Pathology and Laboratory Medicine.    This test should only be used on samples collected 15 days or more   post-symptom onset. Results should not be used as the sole basis   to diagnose or exclude SARS-CoV-2 infection. Results should not be   interpreted as an indication of degree of protection from infection  or after vaccination.     This test is not for the screening of donated blood.   __________________________________________________________________  The [employer] AdviseDx SARS-CoV-2 IgG II Letter of Authorization,  along with the authorized Fact Sheet for Healthcare Providers,  and the authorized Fact Sheet for  Patients are available on the FDA   website:    https://www.fda.gov/media/688100/download  https://www.fda.gov/med                           ia/467663/download  https://www.fda.gov/media/450013/download           ASSESSMENT/PLAN:   1. Long-term use of immunosuppressant medication  COVID-19 (SARS CoV-2) IgG Antibody     GROUP & RH     CBC Auto Differential     Comprehensive Metabolic Panel     C-Reactive Protein     Sedimentation rate     COVID-19 (SARS CoV-2) IgG Antibody     GROUP & RH     CBC Auto Differential     Comprehensive Metabolic Panel     GROUP & RH     COVID-19 (SARS CoV-2) IgG Antibody Quant   2. Hashimoto encephalopathy  Thyroid Peroxidase Antibody     Anti-Thyroglobulin Antibody     T4, Free     TSH     C-Reactive Protein     Sedimentation rate     Thyroid Peroxidase Antibody     Anti-Thyroglobulin Antibody     TSH   3. Vitamin D insufficiency      4. Keratosis pilaris         LAB RESULTS 04/07/2022     CBC Auto Differential    Collection Time: 04/07/22 12:30 PM   Result Value Ref Range    WBC 7.30 3.90 - 12.70 K/uL    RBC 4.42 4.00 - 5.40 M/uL    Hemoglobin 13.0 12.0 - 16.0 g/dL    Hematocrit 40.5 37.0 - 48.5 %    MCV 92 82 - 98 fL    MCH 29.4 27.0 - 31.0 pg    MCHC 32.1 32.0 - 36.0 g/dL    RDW 12.5 11.5 - 14.5 %    Platelets 544 (H) 150 - 450 K/uL    MPV 9.4 9.2 - 12.9 fL    Immature Granulocytes 0.3 0.0 - 0.5 %    Gran # (ANC) 4.1 1.8 - 7.7 K/uL    Immature Grans (Abs) 0.02 0.00 - 0.04 K/uL    Lymph # 2.2 1.0 - 4.8 K/uL    Mono # 0.7 0.3 - 1.0 K/uL    Eos # 0.2 0.0 - 0.5 K/uL    Baso # 0.04 0.00 - 0.20 K/uL    nRBC 0 0 /100 WBC    Gran % 56.6 38.0 - 73.0 %    Lymph % 30.4 18.0 - 48.0 %    Mono % 9.3 4.0 - 15.0 %    Eosinophil % 2.9 0.0 - 8.0 %    Basophil % 0.5 0.0 - 1.9 %    Differential Method Automated    Comprehensive Metabolic Panel    Collection Time: 04/07/22 12:30 PM   Result Value Ref Range    Sodium 137 136 - 145 mmol/L    Potassium 4.0 3.5 - 5.1 mmol/L    Chloride 99 95 - 110 mmol/L     CO2 28 23 - 29 mmol/L    Glucose 80 70 - 110 mg/dL    BUN 13 6 - 20 mg/dL    Creatinine 0.6 0.5 - 1.4 mg/dL    Calcium 10.3 8.7 - 10.5 mg/dL    Total Protein 7.7 6.0 - 8.4 g/dL    Albumin 4.3 3.5 - 5.2 g/dL    Total Bilirubin 0.3 0.1 - 1.0 mg/dL    Alkaline Phosphatase 62 55 - 135 U/L    AST 17 10 - 40 U/L    ALT 12 10 - 44 U/L    Anion Gap 10 8 - 16 mmol/L    eGFR if African American >60.0 >60 mL/min/1.73 m^2    eGFR if non African American >60.0 >60 mL/min/1.73 m^2   C-Reactive Protein    Collection Time: 04/07/22 12:30 PM   Result Value Ref Range    CRP 1.0 0.0 - 8.2 mg/L   Sedimentation rate    Collection Time: 04/07/22 12:30 PM   Result Value Ref Range    Sed Rate 20 0 - 36 mm/Hr   Vitamin D    Collection Time: 04/07/22 12:30 PM   Result Value Ref Range    Vit D, 25-Hydroxy 50 30 - 96 ng/mL   Anti-Thyroglobulin Antibody    Collection Time: 04/07/22 12:30 PM   Result Value Ref Range    Thyroglobulin Ab Screen <4.0 0.0 - 3.9 IU/mL   Thyroid Peroxidase Antibody    Collection Time: 04/07/22 12:30 PM   Result Value Ref Range    Thyroperoxidase Antibodies 160.8 (H) <6.0 IU/mL   TSH    Collection Time: 04/07/22 12:30 PM   Result Value Ref Range    TSH 1.029 0.400 - 4.000 uIU/mL   T4, Free    Collection Time: 04/07/22 12:30 PM   Result Value Ref Range    Free T4 0.86 0.71 - 1.51 ng/dL     Hashimoto encephalopathy:  Patient with 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 Patient had weaned down to mycophenolate 500 mg bid, but in January 2021 was becoming forgetful along with increase in TPO antibody, so mycophenolate was increased to 1000 mg BID, now doing well on 1000 mg qAM and 500 mg qPM. Prior CRP 1.2 mg/L, ESR 11; both remain WNL so no systemic inflammation.  - Repeat labs today; will discuss possible change in dosing of mycophenolate based on these results     Thyroiditis: autoimmune thyroiditis with elevated TPO Abs during admission but  euthyroid, decreasing TPO Ab levels with treatment with a value 184 at diagnosis and all normal since March 2017, then increased in 12/2020 to 330; last 184, still elevated but improved. Euthyroid with last TSH 1.84  - Repeat TPO Abs and TSH today     Vitamin D insufficiency: Prior level good at 54  - Repeat vitamin D level today  - Continue daily supplement     Long-term use of immunosuppressant medication: has been weaned off steroids and methotrexate, tolerating current dose of mycophenolate 1000 mg qAM and 500 mg qPM  - Toxicity labs ordered  - Patient is up to date on flu and covid vaccines     Keratosis pilaris:  - Continue ammonium lactate 12% cream daily after shower      Follow up: Patient is now 21 years old so will find adult neuroimmunologist to transition care      ATTESTATION:  Parent/guardian verbalizes an understanding of the plan of care and has been educated on the purpose, side effects, and desired outcomes of any new medications given with today's visit. All questions were answered to the family's satisfaction as expressed at the close of the visit.    Resident: I obtained the history, examined this patient and recorded my findings in this Progress Note. I discussed the case with the attending staff physician. RESIDENT: Keshawn Park MD .     Fellow: I obtained the history, examined this patient and reviewed the pertinent labs, tests, imaging and other relevant data and recorded my findings in this Progress Note. I discussed the case with the attending staff physician. AI FELLOW: Raegan Rasheed MD.     Staff: Separately from the Fellow/Resident, I examined this patient myself and personally reviewed and recorded the pertinent labs, tests, and other relevant data and confirmed the history and exam. I discussed the case with this physician who recorded the findings; my findings, impressions and plans are as I have edited and verified them above. I discussed my findings and plan with the family.      I personally reviewed the results received after the visit and provided the interpretation to the family myself or via my nurse.    Family instructed to check the portal or call for results in 5-10 days.     Rosa Maria Denis MD, FAAAAI, FAAP  [facility]  [address] [place] [id number]   [phone]  Fax [phone]

## 2022-04-18 NOTE — PROGRESS NOTES
TPO Antibodies remain the same level of positivity, would not change anything. All other labs look great.

## 2022-04-19 ENCOUNTER — TELEPHONE (OUTPATIENT)
Dept: ALLERGY | Facility: CLINIC | Age: 22
End: 2022-04-19
Payer: COMMERCIAL

## 2022-04-19 NOTE — TELEPHONE ENCOUNTER
----- Message from Rosa Maria Denis MD sent at 4/18/2022  5:07 PM CDT -----  TPO Antibodies remain the same level of positivity, would not change anything. All other labs look great.

## 2022-07-21 ENCOUNTER — PATIENT MESSAGE (OUTPATIENT)
Dept: ALLERGY | Facility: CLINIC | Age: 22
End: 2022-07-21
Payer: COMMERCIAL

## 2022-07-22 ENCOUNTER — PATIENT MESSAGE (OUTPATIENT)
Dept: PEDIATRIC NEUROLOGY | Facility: CLINIC | Age: 22
End: 2022-07-22
Payer: COMMERCIAL

## 2022-08-03 ENCOUNTER — TELEPHONE (OUTPATIENT)
Dept: NEUROLOGY | Facility: CLINIC | Age: 22
End: 2022-08-03
Payer: COMMERCIAL

## 2022-08-03 NOTE — TELEPHONE ENCOUNTER
----- Message from Tyree Nunez sent at 8/3/2022 10:44 AM CDT -----  Regarding: appt soon  Contact: PT @ [phone]  Pt is calling to speak to someone in Dr. Zamora's office. Pt states that Dr. Rosa Maria Densi MD ref'd her to see Dr. Zamora. Pt's dx: autoimmune encephalitis. Pt would like to know if Dr. Zamora is able to see/ treat her for her conditions before she schedules. Please call. Thanks.

## 2022-08-10 ENCOUNTER — PATIENT MESSAGE (OUTPATIENT)
Dept: ALLERGY | Facility: CLINIC | Age: 22
End: 2022-08-10
Payer: COMMERCIAL

## 2022-08-11 NOTE — TELEPHONE ENCOUNTER
Spoke with patient and she states her and her mother has to make a decision before she schedules an appt with

## 2022-09-06 ENCOUNTER — PATIENT MESSAGE (OUTPATIENT)
Dept: ALLERGY | Facility: CLINIC | Age: 22
End: 2022-09-06
Payer: COMMERCIAL

## 2022-09-07 DIAGNOSIS — G93.49 HASHIMOTO ENCEPHALOPATHY: Chronic | ICD-10-CM

## 2022-09-07 DIAGNOSIS — E06.3 HASHIMOTO ENCEPHALOPATHY: Chronic | ICD-10-CM

## 2022-09-07 RX ORDER — MYCOPHENOLATE MOFETIL 500 MG/1
TABLET ORAL
Qty: 320 TABLET | Refills: 0 | Status: SHIPPED | OUTPATIENT
Start: 2022-09-07

## 2023-09-07 PROBLEM — L68.0 HIRSUTISM: Status: ACTIVE | Noted: 2023-09-07

## 2023-12-28 PROBLEM — E28.2 PCOS (POLYCYSTIC OVARIAN SYNDROME): Status: ACTIVE | Noted: 2023-12-28
